# Patient Record
Sex: MALE | Race: WHITE
[De-identification: names, ages, dates, MRNs, and addresses within clinical notes are randomized per-mention and may not be internally consistent; named-entity substitution may affect disease eponyms.]

---

## 2018-07-27 ENCOUNTER — HOSPITAL ENCOUNTER (EMERGENCY)
Dept: HOSPITAL 62 - ER | Age: 48
LOS: 1 days | Discharge: HOME | End: 2018-07-28
Payer: SELF-PAY

## 2018-07-27 VITALS — DIASTOLIC BLOOD PRESSURE: 107 MMHG | SYSTOLIC BLOOD PRESSURE: 135 MMHG

## 2018-07-27 DIAGNOSIS — T67.5XXA: Primary | ICD-10-CM

## 2018-07-27 DIAGNOSIS — R11.2: ICD-10-CM
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
## 2018-07-27 DIAGNOSIS — R10.84: ICD-10-CM

## 2018-07-27 DIAGNOSIS — E86.0: ICD-10-CM

## 2018-07-27 LAB
ADD MANUAL DIFF: NO
ALBUMIN SERPL-MCNC: 6 G/DL (ref 3.5–5)
ALP SERPL-CCNC: 102 U/L (ref 38–126)
ALT SERPL-CCNC: 30 U/L (ref 21–72)
ANION GAP SERPL CALC-SCNC: 22 MMOL/L (ref 5–19)
AST SERPL-CCNC: 49 U/L (ref 17–59)
BASOPHILS # BLD AUTO: 0.1 10^3/UL (ref 0–0.2)
BASOPHILS NFR BLD AUTO: 1 % (ref 0–2)
BILIRUB DIRECT SERPL-MCNC: 0.4 MG/DL (ref 0–0.4)
BILIRUB SERPL-MCNC: 1.2 MG/DL (ref 0.2–1.3)
BUN SERPL-MCNC: 31 MG/DL (ref 7–20)
CALCIUM: 12.4 MG/DL (ref 8.4–10.2)
CHLORIDE SERPL-SCNC: 98 MMOL/L (ref 98–107)
CK SERPL-CCNC: 356 U/L (ref 55–170)
CO2 SERPL-SCNC: 21 MMOL/L (ref 22–30)
EOSINOPHIL # BLD AUTO: 0.1 10^3/UL (ref 0–0.6)
EOSINOPHIL NFR BLD AUTO: 0.4 % (ref 0–6)
ERYTHROCYTE [DISTWIDTH] IN BLOOD BY AUTOMATED COUNT: 13.2 % (ref 11.5–14)
GLUCOSE SERPL-MCNC: 99 MG/DL (ref 75–110)
HCT VFR BLD CALC: 51 % (ref 37.9–51)
HGB BLD-MCNC: 18.3 G/DL (ref 13.5–17)
LYMPHOCYTES # BLD AUTO: 2.6 10^3/UL (ref 0.5–4.7)
LYMPHOCYTES NFR BLD AUTO: 16.9 % (ref 13–45)
MCH RBC QN AUTO: 32.6 PG (ref 27–33.4)
MCHC RBC AUTO-ENTMCNC: 35.8 G/DL (ref 32–36)
MCV RBC AUTO: 91 FL (ref 80–97)
MONOCYTES # BLD AUTO: 1 10^3/UL (ref 0.1–1.4)
MONOCYTES NFR BLD AUTO: 6.3 % (ref 3–13)
NEUTROPHILS # BLD AUTO: 11.7 10^3/UL (ref 1.7–8.2)
NEUTS SEG NFR BLD AUTO: 75.4 % (ref 42–78)
PLATELET # BLD: 230 10^3/UL (ref 150–450)
POTASSIUM SERPL-SCNC: 4.9 MMOL/L (ref 3.6–5)
PROT SERPL-MCNC: 9.9 G/DL (ref 6.3–8.2)
RBC # BLD AUTO: 5.6 10^6/UL (ref 4.35–5.55)
SODIUM SERPL-SCNC: 140.7 MMOL/L (ref 137–145)
TOTAL CELLS COUNTED % (AUTO): 100 %
WBC # BLD AUTO: 15.5 10^3/UL (ref 4–10.5)

## 2018-07-27 PROCEDURE — 84484 ASSAY OF TROPONIN QUANT: CPT

## 2018-07-27 PROCEDURE — 96375 TX/PRO/DX INJ NEW DRUG ADDON: CPT

## 2018-07-27 PROCEDURE — 82550 ASSAY OF CK (CPK): CPT

## 2018-07-27 PROCEDURE — 96361 HYDRATE IV INFUSION ADD-ON: CPT

## 2018-07-27 PROCEDURE — 96365 THER/PROPH/DIAG IV INF INIT: CPT

## 2018-07-27 PROCEDURE — 83605 ASSAY OF LACTIC ACID: CPT

## 2018-07-27 PROCEDURE — 81001 URINALYSIS AUTO W/SCOPE: CPT

## 2018-07-27 PROCEDURE — 80053 COMPREHEN METABOLIC PANEL: CPT

## 2018-07-27 PROCEDURE — 85025 COMPLETE CBC W/AUTO DIFF WBC: CPT

## 2018-07-27 PROCEDURE — 36415 COLL VENOUS BLD VENIPUNCTURE: CPT

## 2018-07-27 PROCEDURE — 99284 EMERGENCY DEPT VISIT MOD MDM: CPT

## 2018-07-27 NOTE — ER DOCUMENT REPORT
ED GI/





- General


Mode of Arrival: Ambulatory


Information source: Patient


TRAVEL OUTSIDE OF THE [place] IN LAST 30 DAYS: No





<GRACIE PHAN - Last Filed: 07/28/18 00:31>





<IRAIS MOYER - Last Filed: 07/28/18 01:29>





- General


Chief Complaint: Pain All Over


Stated Complaint: BODY CRAMPING


Time Seen by Provider: 07/27/18 19:17


Notes: 





47-year-old male presenting to the emergency department today with complaints 

of diffuse abdominal cramping with nausea and vomiting.  Patient states he 

worked outside in the heat all day today and only stopped to rehydrate twice.  

Patient states throughout the he only stopped to urinate twice and they were 

"tiny amounts". Patient believes he is dehydrated. (GRACIE PHAN)





- Related Data


Allergies/Adverse Reactions: 


 





No Known Allergies Allergy (Unverified 07/27/18 18:57)


 











Past Medical History





- General


Information source: Patient





- Social History


Smoking Status: Never Smoker


Cigarette use (# per day): No


Chew tobacco use (# tins/day): No


Frequency of alcohol use: None


Drug Abuse: Marijuana


Lives with: Family


Family History: Reviewed & Not Pertinent


Patient has suicidal ideation: No


Patient has homicidal ideation: No


Past Surgical History: Reports: Hx Orthopedic Surgery





<GRACIE PHAN - Last Filed: 07/28/18 00:31>





Review of Systems





- Review of Systems


Constitutional: No symptoms reported


EENT: No symptoms reported


Cardiovascular: No symptoms reported


Respiratory: No symptoms reported


Gastrointestinal: See HPI, Nausea, Vomiting, Other - abdominal cramping


Genitourinary: No symptoms reported


Male Genitourinary: No symptoms reported


Musculoskeletal: No symptoms reported


Skin: No symptoms reported


Hematologic/Lymphatic: No symptoms reported


Neurological/Psychological: No symptoms reported


-: Yes All other systems reviewed and negative





<GRACIE PHAN - Last Filed: 07/28/18 00:31>





- Vital signs


Vitals: 


 











Temp Pulse Resp BP Pulse Ox


 


 98.2 F   82   20   135/107 H  99 


 


 07/27/18 18:58  07/27/18 18:58  07/27/18 18:58  07/27/18 18:58  07/27/18 18:58














Course





- Laboratory


Result Diagrams: 


 07/27/18 19:30





 07/27/18 19:30





<GRACIE PHAN - Last Filed: 07/28/18 00:31>





- Laboratory


Result Diagrams: 


 07/27/18 19:30





 07/27/18 19:30





<COMFORTDIANEIRAIS - Last Filed: 07/28/18 01:29>





- Vital Signs


Vital signs: 


 











Temp Pulse Resp BP Pulse Ox


 


 98.2 F   82   20   135/107 H  99 


 


 07/27/18 18:58  07/27/18 18:58  07/27/18 18:58  07/27/18 18:58  07/27/18 18:58














- Laboratory


Laboratory results interpreted by me: 


 











  07/27/18 07/27/18 07/27/18





  19:30 19:30 21:36


 


WBC  15.5 H  


 


RBC  5.60 H  


 


Hgb  18.3 H  


 


Absolute Neutrophils  11.7 H  


 


Carbon Dioxide   21 L 


 


Anion Gap   22 H 


 


BUN   31 H 


 


Creatinine   2.46 H 


 


Est GFR ( Amer)   34 L 


 


Est GFR (Non-Af Amer)   28 L 


 


Calcium   12.4 H* 


 


Creatine Kinase   356 H 


 


Total Protein   9.9 H 


 


Albumin   6.0 H 


 


Urine Protein    30 H


 


Urine Ketones    TRACE H


 


Urine Blood    SMALL H


 


Urine Urobilinogen    2.0 H














Discharge





<GRACIE PHAN - Last Filed: 07/28/18 00:31>





<IRAIS MOYER - Last Filed: 07/28/18 01:29>





- Discharge


Clinical Impression: 


 Dehydration, Muscle cramps, Hypercalcemia





Heat exhaustion


Qualifiers:


 Encounter type: initial encounter Qualified Code(s): T67.5XXA - Heat exhaustion

, unspecified, initial encounter





Acute renal failure


Qualifiers:


 Acute renal failure type: unspecified Qualified Code(s): N17.9 - Acute kidney 

failure, unspecified





GERD (gastroesophageal reflux disease)


Qualifiers:


 Esophagitis presence: esophagitis presence not specified Qualified Code(s): 

K21.9 - Gastro-esophageal reflux disease without esophagitis





Condition: Stable


Disposition: HOME, SELF-CARE


Additional Instructions: 


You suffered heat exhaustion today from not drinking enough water.


This caused her kidneys to start shutting down.


You also are suffering from gastroesophageal reflux disease also known as GERD.


One unexpected abnormality was an elevated calcium level.  This can be due to 

parathyroid hormone problems.


For the weekend you should drink plenty of water throughout the day and the 

evening even if that means having to get up during the night to urinate.  This 

is critically important to helping your kidney function returned to normal.





You should avoid spicy foods.


Take antacids between meals and at bedtime.


Elevate head of your bed.


Take Prilosec OTC once daily.





Follow-up with a local medical doctor Monday to repeat your lab tests and take 

copies of the lab work from [facility] with you.





RETURN TO THE EMERGENCY ROOM IF ANY NEW OR WORSENING SYMPTOMS.








Referrals: 


LOCALMD,NO [NO LOCAL MD] - Follow up as needed


Scribe Attestation: 





07/27/18 22:42


I personally performed the services described in the documentation, reviewed 

and edited the documentation which was dictated to the scribe in my presence, 

and it accurately records my words and actions. (IRAIS MOYER)





Scribe Documentation





- Scribe


Written by Mitaliibe:: Marsha Lowery, 7/28/2018 0036


acting as scribe for :: [employer]





<GRACIE PHAN - Last Filed: 07/28/18 00:31>

## 2018-07-27 NOTE — ER DOCUMENT REPORT
ED Medical Screen (RME)





- General


Chief Complaint: Pain All Over


Stated Complaint: BODY CRAMPING


Time Seen by Provider: 07/27/18 19:17


Notes: 





47 years old male was working the whole day out in the sun sweating, drank only 

2 times, towards the end of the work started having nausea vomited once all the 

fluid that he drank.  Developed diffuse muscle aches and cramps shivering 

therefore came to the ED.


Denies any dizziness denies any focal weakness.  Denies any chest pain or 

shortness of breath.  Diffuse abdominal cramp but denies any diarrhea dysuria 

frequency.


TRAVEL OUTSIDE OF THE [place] IN LAST 30 DAYS: No





- Related Data


Allergies/Adverse Reactions: 


 





No Known Allergies Allergy (Unverified 07/27/18 18:57)


 











Physical Exam





- Vital signs


Vitals: 





 











Temp Pulse Resp BP Pulse Ox


 


 98.2 F   82   20   135/107 H  99 


 


 07/27/18 18:58  07/27/18 18:58  07/27/18 18:58  07/27/18 18:58  07/27/18 18:58














Course





- Vital Signs


Vital signs: 





 











Temp Pulse Resp BP Pulse Ox


 


 98.2 F   82   20   135/107 H  99 


 


 07/27/18 18:58  07/27/18 18:58  07/27/18 18:58  07/27/18 18:58  07/27/18 18:58














Doctor's Discharge





- Discharge


Referrals: 


LOCALMD,NO [Primary Care Provider] - Follow up as needed

## 2018-07-28 LAB
APPEARANCE UR: (no result)
APTT PPP: YELLOW S
BILIRUB UR QL STRIP: NEGATIVE
GLUCOSE UR STRIP-MCNC: NEGATIVE MG/DL
KETONES UR STRIP-MCNC: (no result) MG/DL
NITRITE UR QL STRIP: NEGATIVE
PH UR STRIP: 5 [PH] (ref 5–9)
PROT UR STRIP-MCNC: 30 MG/DL
SP GR UR STRIP: 1.02
UROBILINOGEN UR-MCNC: 2 MG/DL (ref ?–2)